# Patient Record
Sex: MALE | Race: WHITE
[De-identification: names, ages, dates, MRNs, and addresses within clinical notes are randomized per-mention and may not be internally consistent; named-entity substitution may affect disease eponyms.]

---

## 2023-05-16 ENCOUNTER — HOSPITAL ENCOUNTER (EMERGENCY)
Dept: HOSPITAL 65 - ER | Age: 33
Discharge: HOME | End: 2023-05-16
Payer: SELF-PAY

## 2023-05-16 VITALS — DIASTOLIC BLOOD PRESSURE: 75 MMHG | SYSTOLIC BLOOD PRESSURE: 130 MMHG

## 2023-05-16 DIAGNOSIS — Z76.0: ICD-10-CM

## 2023-05-16 DIAGNOSIS — Z91.148: ICD-10-CM

## 2023-05-16 DIAGNOSIS — E11.69: Primary | ICD-10-CM

## 2023-05-16 PROCEDURE — 99281 EMR DPT VST MAYX REQ PHY/QHP: CPT

## 2023-05-16 NOTE — ER.PDOC
General


Chief Complaint:  General Complaint


Stated Complaint:  GENERAL


TRAVEL OUT OF US:  No


Time seen by MD:  15:57


Source:  patient


Exam Limitations:  no limitations





History of Present Illness


Initial Comments


Patient is a diabetic who works by traveling from place to place.  While here, 

he ran out of his insulin.  He is requesting for refill.


Associated Symptoms:  denies symptoms


Allergies:  


Coded Allergies:  


     No Known Allergies (Unverified , 5/16/23)





Past Medical History


Medical History:  diabetes


Surgical History:  no surgical history





Family History


Significant Family History:  no pertinent family hx





Social History


Smoking:  non-smoker


Alcohol Use:  none


Drug Use:  none





Review of Systems


Constitutional:  no symptoms reported


EENTM:  no symptoms reported


Respiratory:  no symptoms reported


Cardiovascular:  no symptoms reported


Gastrointestinal:  no symptoms reported


All Other Systems:  Reviewed and Negative





Physical Exam


General Appearance:  No Apparent Distress, WD/WN


Neck:  Non-Tender, Full Range of Motion, Supple, Normal Inspection


Respiratory:  chest non-tender, lungs clear, normal breath sounds, no 

respiratory distress


CVS:  reg rate & rhythm, no murmur, no gallop, pulses nml, nml capillary refill


Gastrointestinal:  Normal Bowel Sounds, No Organomegaly, No Pulsatile Mass, Non 

Tender


Back:  Normal Inspection, No CVA Tenderness, No Vertebral Tenderness


Extremities:  Normal Range of Motion, Non-Tender, Normal Inspection


Neurologic/Psychiatric:  CNs II-XII NML as Tested, No Motor/Sensory Deficits, 

Alert, Normal Mood/Affect


Skin:  Normal Color





Results/Orders


Results/Orders





Vital Signs








  Date Time  Temp Pulse Resp B/P (MAP) Pulse Ox O2 Delivery O2 Flow Rate FiO2


 


5/16/23 15:07 97.6 71 18  94   


 


5/16/23 15:07 97.6 71 18     


 


5/16/23 15:07 97.6 71 18 130/75 (93) 94 Room Air* 0 21











Progress


Progress


Patient showed me an old prescription and based on that, I refilled his insulin.





ER DEPART


Departure


Time of Disposition:  16:00


Disposition:  01 HOME / SELF CARE / HOMELESS


Impression:  


   Primary Impression:  


   Diabetes mellitus


   Additional Impression:  


   Medication refill


Condition:  Stable


Referrals:  


PCP,UNKNOWN (PCP)


PRIMARY CARE PROVIDER





Additional Instructions:  


NovoLog FlexPen injection refilled.


Follow-up with your doctor as needed.


Duration or Time Spent with Pa:  10 min





Problem Qualifiers








   Primary Impression:  


   Diabetes mellitus


   Diabetes mellitus type:  other specified (including LUI)  Diabetes mellitus 

   long term insulin use:  with long term use  Diabetes mellitus complication 

   status:  with other specified complication  Qualified Codes:  E13.69 - Other 

   specified diabetes mellitus with other specified complication; Z79.4 - Long 

   term (current) use of insulin








JAQUI TAM MD                May 16, 2023 16:01